# Patient Record
Sex: FEMALE | Race: WHITE | URBAN - METROPOLITAN AREA
[De-identification: names, ages, dates, MRNs, and addresses within clinical notes are randomized per-mention and may not be internally consistent; named-entity substitution may affect disease eponyms.]

---

## 2024-06-08 ENCOUNTER — OFFICE VISIT (OUTPATIENT)
Dept: URGENT CARE | Age: 39
End: 2024-06-08

## 2024-06-08 VITALS
DIASTOLIC BLOOD PRESSURE: 69 MMHG | WEIGHT: 124.4 LBS | HEART RATE: 79 BPM | BODY MASS INDEX: 19.53 KG/M2 | TEMPERATURE: 98.1 F | HEIGHT: 67 IN | OXYGEN SATURATION: 98 % | SYSTOLIC BLOOD PRESSURE: 100 MMHG

## 2024-06-08 DIAGNOSIS — Z53.21 PATIENT LEFT WITHOUT BEING SEEN: Primary | ICD-10-CM

## 2024-06-08 RX ORDER — LAMOTRIGINE 100 MG/1
TABLET, EXTENDED RELEASE ORAL
COMMUNITY
Start: 2024-01-30 | End: 2025-04-29

## 2024-06-08 RX ORDER — FLUOXETINE HYDROCHLORIDE 20 MG/1
20 CAPSULE ORAL DAILY
COMMUNITY

## 2024-06-08 RX ORDER — CLOBAZAM 10 MG/1
15 TABLET ORAL NIGHTLY
COMMUNITY
Start: 2024-04-29 | End: 2024-10-26

## 2024-06-08 RX ORDER — ZONISAMIDE 100 MG/1
100 CAPSULE ORAL NIGHTLY
COMMUNITY
Start: 2023-12-04 | End: 2025-04-29

## 2024-06-08 NOTE — PROGRESS NOTES
Latosha Lewis (: 1985) is a 38 y.o. female, New patient, here for evaluation of the following chief complaint(s):  Elbow Injury (Fell on left elbow around 6 pm yesterday - causing pain- bruising and swelling,  Alternating IBU and Tylenol.  )      ASSESSMENT/PLAN:    ICD-10-CM    1. Patient left without being seen  Z53.21             SUBJECTIVE/OBJECTIVE:  Pt left prior to provider seeing pt.           VITAL SIGNS  Vitals:    24 1312   BP: 100/69   Site: Right Upper Arm   Pulse: 79   Temp: 98.1 °F (36.7 °C)   SpO2: 98%   Weight: 56.4 kg (124 lb 6.4 oz)   Height: 1.702 m (5' 7\")       Review of Systems  See HPI for pertinent positives and negatives.    Physical Exam  PROCEDURES:  Unless otherwise noted below, none     Procedures    RESULTS:  No results found for this visit on 24.  An electronic signature was used to authenticate this note.    --Osman Johnson PA-C